# Patient Record
(demographics unavailable — no encounter records)

---

## 2024-10-09 NOTE — PHYSICAL EXAM
[de-identified] : Left foot Physical Examination:  General: Alert and oriented x3.  In no acute distress.  Pleasant in nature with a normal affect.  No apparent respiratory distress.  Erythema, Warmth, Rubor: Negative Swelling: Negative  Patient has no pain when palpating the navicular. Patient has very mild tenderness to palpation when palpating the cuboid.  ROM Ankle: 1. Dorsiflexion: 10 degrees 2. Plantarflexion: 40 degrees 3. Inversion: 30 degrees 4. Eversion: 20 degrees  ROM of digits: Normal  Pes Planus: Negative Pes Cavus: Negative  Bunion: Negative Tailor's Bunion (Bunionette): Negative Hammer Toe Deformity/Deformities: Negative  Tenderness to Palpation:  1. Heel Pain: Negative 2. Midfoot Pain: Negative 3. First MTP Joint: Negative 4. Lis Franc Joint: Negative  Tenderness Metatarsals: 1st MT: Negative 2nd MT: Negative 3rd MT: Negative 4th MT: Negative 5th MT: Negative Base of the 5th MT: Negative  Ligament Pain: 1. Lis Franc Ligament: Negative 2. Plantar Fascia Ligament: Negative  Strength:  5/5 TA/GS/EHL/FHL/EDL/ADD/ABD  Pulses: 2+ DP/PT Pulses  Capillary Refill Toes: <2 seconds  Neuro: Intact motor and sensory throughout  Additional Test: 1. Gandhi's Squeeze Test: Negative 2. Calcaneal Squeeze Test: Negative [de-identified] : No new imaging performed today.  Patient's pain is much improved and she denied x-rays.

## 2024-10-09 NOTE — HISTORY OF PRESENT ILLNESS
[FreeTextEntry1] : 10/09/2024: VINCE GAMBLE is a 45 year old female presenting to the office for a follow up evaluation of her left foot, navicular fracture.  The patient states that her pain is much improved.  She presents wearing an ASO brace with slip on Yobany shoes, walking without assistance.  Currently enrolled in physical therapy which is helping her.  No other complaints.  08/19/2024: VINCE GAMBLE is a 45 year old female presenting to the office for a follow up evaluation of her left foot, navicular fracture. She reports improvement in her symptoms since last visit. However, she states that she has pain with plantarflexion. She also states that she has not tried to bear weight. The patient presents to the office NWB wearing a CAM boot and ambulating with a single crutch.  8/2/2024: The patient is a 45-year-old female who presents for follow-up left foot, navicular fracture. The patient states that she can bear weight with the CAM boot without difficulty. However, after prolonged periods on her feet, she endorses pain in the lateral aspect and dorsum of her foot over the fracture site. The patient visited Dr. Frederick, who recommended that she get treatment for her blood clot, which was diagnosed on 07/08/2024. The patient plans to travel by plane on Tuesday. Dr. Frederick advised her to use blood thinners. The patient presents to the office NWB wearing a CAM boot and ambulating with crutches.  7/12/2024: The patient is a 45 year old female presenting for a CT review of her left foot. The patient was diagnosed with a DVT and is currently on Eliquis for DVT prophylaxis under the direction of her PCP. She states that her calf pain has significantly gone down but is still feeling discomfort in her foot. She presents today wearing a CAM boot and is ambulating with crutches. No other complaints.   07/08/2024: VINCE GAMBLE is a 45 year old female presenting to the office for an initial evaluation of left foot, displaced fracture of the navicular. She reports that the pain began on 07/02/2024, when she mis-stepped on vacation. She also endorses pain in her calf. She does state that she used Lovenox during her pregnancy years ago. The patient presents to the office NWB wearing a CAM boot and ambulating with crutches.

## 2024-10-09 NOTE — ADDENDUM
[FreeTextEntry1] : I, Jacinto Prakash, acted solely as a scribe for Dr. Paul Truong on this date 08/19/2024.   All medical record entries made by the Scribe were at my, Dr. Paul Truong, direction and personally dictated by me on 08/19/2024. I have reviewed the chart and agree that the record accurately reflects my personal performance of the history, physical exam, assessment and plan. I have also personally directed, reviewed, and agreed with the chart.

## 2024-10-09 NOTE — DISCUSSION/SUMMARY
[de-identified] : Overall, the patient's pain and symptoms are improving significantly in the left ankle and foot.  She will continue with physical therapy working on strength and conditioning program ankle and foot.  She will work with her physical therapist to slowly get back to normal activities safely.  Proper shoewear discussed.  Continue with the ASO brace as needed.  Over-the-counter NSAIDs and Tylenol if she has pain.  She can follow-up on an as-needed basis.  Patient currently under the care of a vascular specialist for her DVT, currently on Eliquis.